# Patient Record
Sex: FEMALE | Race: WHITE | Employment: UNEMPLOYED | URBAN - NONMETROPOLITAN AREA
[De-identification: names, ages, dates, MRNs, and addresses within clinical notes are randomized per-mention and may not be internally consistent; named-entity substitution may affect disease eponyms.]

---

## 2019-05-04 ENCOUNTER — APPOINTMENT (OUTPATIENT)
Dept: GENERAL RADIOLOGY | Age: 44
End: 2019-05-04
Payer: MEDICARE

## 2019-05-04 ENCOUNTER — HOSPITAL ENCOUNTER (EMERGENCY)
Age: 44
Discharge: HOME | End: 2019-05-04
Attending: EMERGENCY MEDICINE
Payer: MEDICARE

## 2019-05-04 ENCOUNTER — HOSPITAL ENCOUNTER (OUTPATIENT)
Age: 44
Setting detail: OBSERVATION
Discharge: HOME | End: 2019-05-05
Attending: EMERGENCY MEDICINE | Admitting: FAMILY MEDICINE
Payer: MEDICARE

## 2019-05-04 ENCOUNTER — APPOINTMENT (OUTPATIENT)
Dept: CT IMAGING | Age: 44
End: 2019-05-04
Payer: MEDICARE

## 2019-05-04 VITALS
SYSTOLIC BLOOD PRESSURE: 113 MMHG | HEIGHT: 61 IN | BODY MASS INDEX: 40.78 KG/M2 | OXYGEN SATURATION: 95 % | TEMPERATURE: 98.6 F | RESPIRATION RATE: 26 BRPM | DIASTOLIC BLOOD PRESSURE: 73 MMHG | WEIGHT: 216 LBS | HEART RATE: 92 BPM

## 2019-05-04 DIAGNOSIS — R07.9 CHEST PAIN, UNSPECIFIED TYPE: Primary | ICD-10-CM

## 2019-05-04 DIAGNOSIS — E87.6 HYPOKALEMIA: ICD-10-CM

## 2019-05-04 PROBLEM — R07.2 SUBSTERNAL CHEST PAIN: Status: ACTIVE | Noted: 2019-05-04

## 2019-05-04 LAB
ALBUMIN SERPL-MCNC: 3.9 GM/DL (ref 3.4–5)
ALBUMIN SERPL-MCNC: 4.1 GM/DL (ref 3.4–5)
ALP BLD-CCNC: 41 IU/L (ref 40–129)
ALP BLD-CCNC: 47 IU/L (ref 40–129)
ALT SERPL-CCNC: 131 U/L (ref 10–40)
ALT SERPL-CCNC: 27 U/L (ref 10–40)
ANION GAP SERPL CALCULATED.3IONS-SCNC: 12 MMOL/L (ref 4–16)
ANION GAP SERPL CALCULATED.3IONS-SCNC: 13 MMOL/L (ref 4–16)
AST SERPL-CCNC: 144 IU/L (ref 15–37)
AST SERPL-CCNC: 32 IU/L (ref 15–37)
BASOPHILS ABSOLUTE: 0 K/CU MM
BASOPHILS ABSOLUTE: 0 K/CU MM
BASOPHILS RELATIVE PERCENT: 0.5 % (ref 0–1)
BASOPHILS RELATIVE PERCENT: 0.6 % (ref 0–1)
BILIRUB SERPL-MCNC: 0.3 MG/DL (ref 0–1)
BILIRUB SERPL-MCNC: 0.4 MG/DL (ref 0–1)
BUN BLDV-MCNC: 5 MG/DL (ref 6–23)
BUN BLDV-MCNC: 6 MG/DL (ref 6–23)
CALCIUM SERPL-MCNC: 8.8 MG/DL (ref 8.3–10.6)
CALCIUM SERPL-MCNC: 8.8 MG/DL (ref 8.3–10.6)
CHLORIDE BLD-SCNC: 101 MMOL/L (ref 99–110)
CHLORIDE BLD-SCNC: 102 MMOL/L (ref 99–110)
CO2: 24 MMOL/L (ref 21–32)
CO2: 25 MMOL/L (ref 21–32)
CREAT SERPL-MCNC: 0.7 MG/DL (ref 0.6–1.1)
CREAT SERPL-MCNC: 0.8 MG/DL (ref 0.6–1.1)
D DIMER: 748 NG/ML(DDU)
DIFFERENTIAL TYPE: ABNORMAL
DIFFERENTIAL TYPE: ABNORMAL
EOSINOPHILS ABSOLUTE: 0 K/CU MM
EOSINOPHILS ABSOLUTE: 0.1 K/CU MM
EOSINOPHILS RELATIVE PERCENT: 0.3 % (ref 0–3)
EOSINOPHILS RELATIVE PERCENT: 0.8 % (ref 0–3)
GFR AFRICAN AMERICAN: >60 ML/MIN/1.73M2
GFR AFRICAN AMERICAN: >60 ML/MIN/1.73M2
GFR NON-AFRICAN AMERICAN: >60 ML/MIN/1.73M2
GFR NON-AFRICAN AMERICAN: >60 ML/MIN/1.73M2
GLUCOSE BLD-MCNC: 121 MG/DL (ref 70–99)
GLUCOSE BLD-MCNC: 168 MG/DL (ref 70–99)
HCG QUALITATIVE: NEGATIVE
HCT VFR BLD CALC: 45.8 % (ref 37–47)
HCT VFR BLD CALC: 46 % (ref 37–47)
HEMOGLOBIN: 15.2 GM/DL (ref 12.5–16)
HEMOGLOBIN: 15.6 GM/DL (ref 12.5–16)
IMMATURE NEUTROPHIL %: 0.4 % (ref 0–0.43)
IMMATURE NEUTROPHIL %: 0.5 % (ref 0–0.43)
LIPASE: 24 IU/L (ref 13–60)
LYMPHOCYTES ABSOLUTE: 0.6 K/CU MM
LYMPHOCYTES ABSOLUTE: 0.6 K/CU MM
LYMPHOCYTES RELATIVE PERCENT: 7.9 % (ref 24–44)
LYMPHOCYTES RELATIVE PERCENT: 9.7 % (ref 24–44)
MAGNESIUM: 1.8 MG/DL (ref 1.8–2.4)
MCH RBC QN AUTO: 32.1 PG (ref 27–31)
MCH RBC QN AUTO: 32.1 PG (ref 27–31)
MCHC RBC AUTO-ENTMCNC: 33.2 % (ref 32–36)
MCHC RBC AUTO-ENTMCNC: 33.9 % (ref 32–36)
MCV RBC AUTO: 94.7 FL (ref 78–100)
MCV RBC AUTO: 96.6 FL (ref 78–100)
MONOCYTES ABSOLUTE: 0.3 K/CU MM
MONOCYTES ABSOLUTE: 0.6 K/CU MM
MONOCYTES RELATIVE PERCENT: 5.2 % (ref 0–4)
MONOCYTES RELATIVE PERCENT: 8.2 % (ref 0–4)
PDW BLD-RTO: 13.9 % (ref 11.7–14.9)
PDW BLD-RTO: 14.1 % (ref 11.7–14.9)
PLATELET # BLD: 280 K/CU MM (ref 140–440)
PLATELET # BLD: 280 K/CU MM (ref 140–440)
PMV BLD AUTO: 10.7 FL (ref 7.5–11.1)
PMV BLD AUTO: 10.8 FL (ref 7.5–11.1)
POTASSIUM SERPL-SCNC: 3.3 MMOL/L (ref 3.5–5.1)
POTASSIUM SERPL-SCNC: 3.3 MMOL/L (ref 3.5–5.1)
RBC # BLD: 4.74 M/CU MM (ref 4.2–5.4)
RBC # BLD: 4.86 M/CU MM (ref 4.2–5.4)
SEGMENTED NEUTROPHILS ABSOLUTE COUNT: 5 K/CU MM
SEGMENTED NEUTROPHILS ABSOLUTE COUNT: 6 K/CU MM
SEGMENTED NEUTROPHILS RELATIVE PERCENT: 82.6 % (ref 36–66)
SEGMENTED NEUTROPHILS RELATIVE PERCENT: 83.3 % (ref 36–66)
SODIUM BLD-SCNC: 138 MMOL/L (ref 135–145)
SODIUM BLD-SCNC: 139 MMOL/L (ref 135–145)
TOTAL IMMATURE NEUTOROPHIL: 0.03 K/CU MM
TOTAL IMMATURE NEUTOROPHIL: 0.03 K/CU MM
TOTAL PROTEIN: 6.7 GM/DL (ref 6.4–8.2)
TOTAL PROTEIN: 7.3 GM/DL (ref 6.4–8.2)
TROPONIN T: <0.01 NG/ML
TROPONIN T: <0.01 NG/ML
WBC # BLD: 6 K/CU MM (ref 4–10.5)
WBC # BLD: 7.2 K/CU MM (ref 4–10.5)

## 2019-05-04 PROCEDURE — 85025 COMPLETE CBC W/AUTO DIFF WBC: CPT

## 2019-05-04 PROCEDURE — 71045 X-RAY EXAM CHEST 1 VIEW: CPT

## 2019-05-04 PROCEDURE — 2580000003 HC RX 258: Performed by: NURSE PRACTITIONER

## 2019-05-04 PROCEDURE — 6360000004 HC RX CONTRAST MEDICATION: Performed by: EMERGENCY MEDICINE

## 2019-05-04 PROCEDURE — 93005 ELECTROCARDIOGRAM TRACING: CPT | Performed by: EMERGENCY MEDICINE

## 2019-05-04 PROCEDURE — 99285 EMERGENCY DEPT VISIT HI MDM: CPT

## 2019-05-04 PROCEDURE — 84703 CHORIONIC GONADOTROPIN ASSAY: CPT

## 2019-05-04 PROCEDURE — 80053 COMPREHEN METABOLIC PANEL: CPT

## 2019-05-04 PROCEDURE — 6370000000 HC RX 637 (ALT 250 FOR IP): Performed by: EMERGENCY MEDICINE

## 2019-05-04 PROCEDURE — G0378 HOSPITAL OBSERVATION PER HR: HCPCS

## 2019-05-04 PROCEDURE — 84484 ASSAY OF TROPONIN QUANT: CPT

## 2019-05-04 PROCEDURE — 83690 ASSAY OF LIPASE: CPT

## 2019-05-04 PROCEDURE — 71275 CT ANGIOGRAPHY CHEST: CPT

## 2019-05-04 PROCEDURE — 6370000000 HC RX 637 (ALT 250 FOR IP): Performed by: NURSE PRACTITIONER

## 2019-05-04 PROCEDURE — 83735 ASSAY OF MAGNESIUM: CPT

## 2019-05-04 PROCEDURE — 85379 FIBRIN DEGRADATION QUANT: CPT

## 2019-05-04 RX ORDER — NICOTINE 21 MG/24HR
1 PATCH, TRANSDERMAL 24 HOURS TRANSDERMAL DAILY
Status: DISCONTINUED | OUTPATIENT
Start: 2019-05-04 | End: 2019-05-05 | Stop reason: HOSPADM

## 2019-05-04 RX ORDER — ATORVASTATIN CALCIUM 40 MG/1
40 TABLET, FILM COATED ORAL NIGHTLY
Status: DISCONTINUED | OUTPATIENT
Start: 2019-05-04 | End: 2019-05-05 | Stop reason: HOSPADM

## 2019-05-04 RX ORDER — NITROGLYCERIN 0.4 MG/1
0.4 TABLET SUBLINGUAL EVERY 5 MIN PRN
Status: DISCONTINUED | OUTPATIENT
Start: 2019-05-04 | End: 2019-05-05 | Stop reason: HOSPADM

## 2019-05-04 RX ORDER — SODIUM CHLORIDE 0.9 % (FLUSH) 0.9 %
10 SYRINGE (ML) INJECTION EVERY 12 HOURS SCHEDULED
Status: DISCONTINUED | OUTPATIENT
Start: 2019-05-04 | End: 2019-05-05 | Stop reason: HOSPADM

## 2019-05-04 RX ORDER — SENNA PLUS 8.6 MG/1
1 TABLET ORAL DAILY PRN
Status: DISCONTINUED | OUTPATIENT
Start: 2019-05-04 | End: 2019-05-05 | Stop reason: HOSPADM

## 2019-05-04 RX ORDER — ASPIRIN 81 MG/1
81 TABLET, CHEWABLE ORAL DAILY
Status: DISCONTINUED | OUTPATIENT
Start: 2019-05-05 | End: 2019-05-05 | Stop reason: HOSPADM

## 2019-05-04 RX ORDER — POTASSIUM CHLORIDE 20 MEQ/1
40 TABLET, EXTENDED RELEASE ORAL ONCE
Status: COMPLETED | OUTPATIENT
Start: 2019-05-04 | End: 2019-05-04

## 2019-05-04 RX ORDER — HYDROCODONE BITARTRATE AND ACETAMINOPHEN 5; 325 MG/1; MG/1
1 TABLET ORAL EVERY 6 HOURS PRN
COMMUNITY

## 2019-05-04 RX ORDER — SODIUM CHLORIDE 0.9 % (FLUSH) 0.9 %
10 SYRINGE (ML) INJECTION PRN
Status: DISCONTINUED | OUTPATIENT
Start: 2019-05-04 | End: 2019-05-05 | Stop reason: HOSPADM

## 2019-05-04 RX ORDER — ONDANSETRON 2 MG/ML
4 INJECTION INTRAMUSCULAR; INTRAVENOUS EVERY 6 HOURS PRN
Status: DISCONTINUED | OUTPATIENT
Start: 2019-05-04 | End: 2019-05-05 | Stop reason: HOSPADM

## 2019-05-04 RX ORDER — IBUPROFEN 800 MG/1
800 TABLET ORAL EVERY 6 HOURS PRN
Status: DISCONTINUED | OUTPATIENT
Start: 2019-05-04 | End: 2019-05-05 | Stop reason: HOSPADM

## 2019-05-04 RX ORDER — HYDROCODONE BITARTRATE AND ACETAMINOPHEN 5; 325 MG/1; MG/1
1 TABLET ORAL EVERY 6 HOURS PRN
Status: DISCONTINUED | OUTPATIENT
Start: 2019-05-04 | End: 2019-05-05 | Stop reason: HOSPADM

## 2019-05-04 RX ADMIN — ATORVASTATIN CALCIUM 40 MG: 40 TABLET, FILM COATED ORAL at 22:00

## 2019-05-04 RX ADMIN — Medication 10 ML: at 22:00

## 2019-05-04 RX ADMIN — IOPAMIDOL 75 ML: 755 INJECTION, SOLUTION INTRAVENOUS at 15:23

## 2019-05-04 RX ADMIN — POTASSIUM CHLORIDE 40 MEQ: 20 TABLET, EXTENDED RELEASE ORAL at 20:57

## 2019-05-04 RX ADMIN — IBUPROFEN 800 MG: 800 TABLET ORAL at 21:59

## 2019-05-04 ASSESSMENT — PAIN DESCRIPTION - ORIENTATION
ORIENTATION: MID;UPPER
ORIENTATION: LEFT

## 2019-05-04 ASSESSMENT — PAIN DESCRIPTION - FREQUENCY: FREQUENCY: CONTINUOUS

## 2019-05-04 ASSESSMENT — PAIN SCALES - GENERAL
PAINLEVEL_OUTOF10: 0
PAINLEVEL_OUTOF10: 9
PAINLEVEL_OUTOF10: 0
PAINLEVEL_OUTOF10: 4

## 2019-05-04 ASSESSMENT — PAIN DESCRIPTION - ONSET
ONSET: SUDDEN
ONSET: PROGRESSIVE

## 2019-05-04 ASSESSMENT — PAIN DESCRIPTION - PROGRESSION
CLINICAL_PROGRESSION: GRADUALLY WORSENING
CLINICAL_PROGRESSION: GRADUALLY IMPROVING

## 2019-05-04 ASSESSMENT — PAIN DESCRIPTION - DESCRIPTORS
DESCRIPTORS: DISCOMFORT;ACHING
DESCRIPTORS: PRESSURE;SHARP

## 2019-05-04 ASSESSMENT — PAIN DESCRIPTION - LOCATION
LOCATION: BACK
LOCATION: BACK;CHEST;SHOULDER

## 2019-05-04 ASSESSMENT — PAIN - FUNCTIONAL ASSESSMENT: PAIN_FUNCTIONAL_ASSESSMENT: PREVENTS OR INTERFERES SOME ACTIVE ACTIVITIES AND ADLS

## 2019-05-04 ASSESSMENT — PAIN DESCRIPTION - PAIN TYPE
TYPE: ACUTE PAIN
TYPE: ACUTE PAIN

## 2019-05-04 NOTE — ED PROVIDER NOTES
eMERGENCY dEPARTMENT eNCOUnter      CHIEF COMPLAINT:   Chest pain    HPI: Raquel Metz is a 44 y.o. female who presents to the Emergency Department complaining of chest pain. The patient states that the pain started around 1 PM today. She describes it as a pressure. She rates it as 4 out of 10 pain. It radiated to her back. The pain was constant, however she states that it resolved on arrival to the ED. She denies any associated shortness of breath, nausea, vomiting or diaphoresis. There are no exacerbating or relieving factors. The patient denies a known history of coronary artery disease. Cardiac risk factors include tobacco abuse and family history. There is no known history of DVT, PE, or thoracic aortic dissection. The patient denies leg pain or leg swelling. The patient denies fevers, chills, neck pain, shortness of breath, cough, hemoptysis, abdominal pain, nausea, vomiting, numbness, tingling, weakness, or any other complaints. REVIEW OF SYSTEMS:  CONSTITUTIONAL:  Denies fever, chills, weight loss or weakness  EYES:  Denies photophobia or discharge  ENT:  Denies sore throat or ear pain  CARDIOVASCULAR: Complains of chest pain  RESPIRATORY:  Denies cough or shortness of breath  GI:  Denies abdominal pain, nausea, vomiting, or diarrhea  MUSCULOSKELETAL:  Denies back pain  SKIN:  No rash  NEUROLOGIC:  Denies headache, focal weakness or sensory changes  All systems negative except as marked. \"Remaining review of systems reviewed and negative. I have reviewed the nursing triage documentation and agree unless otherwise noted below. \"      PAST MEDICAL HISTORY:   History reviewed. No pertinent past medical history. CURRENT MEDICATIONS:   Home medications reviewed. SURGICAL HISTORY:   Past Surgical History:   Procedure Laterality Date    CHOLECYSTECTOMY      TUBAL LIGATION         FAMILY HISTORY:   History reviewed. No pertinent family history.     SOCIAL HISTORY:   Social History     Socioeconomic History    Marital status:      Spouse name: Not on file    Number of children: Not on file    Years of education: Not on file    Highest education level: Not on file   Occupational History    Not on file   Social Needs    Financial resource strain: Not on file    Food insecurity:     Worry: Not on file     Inability: Not on file    Transportation needs:     Medical: Not on file     Non-medical: Not on file   Tobacco Use    Smoking status: Current Every Day Smoker     Packs/day: 2.00     Types: Cigarettes    Smokeless tobacco: Never Used   Substance and Sexual Activity    Alcohol use: No    Drug use: No    Sexual activity: Not Currently   Lifestyle    Physical activity:     Days per week: Not on file     Minutes per session: Not on file    Stress: Not on file   Relationships    Social connections:     Talks on phone: Not on file     Gets together: Not on file     Attends Catholic service: Not on file     Active member of club or organization: Not on file     Attends meetings of clubs or organizations: Not on file     Relationship status: Not on file    Intimate partner violence:     Fear of current or ex partner: Not on file     Emotionally abused: Not on file     Physically abused: Not on file     Forced sexual activity: Not on file   Other Topics Concern    Not on file   Social History Narrative    Not on file       ALLERGIES: Morphine    PHYSICAL EXAM:  VITAL SIGNS:   ED Triage Vitals [05/04/19 1405]   Enc Vitals Group      /82      Pulse 91      Resp 14      Temp 98.6 °F (37 °C)      Temp Source Oral      SpO2 94 %      Weight 216 lb (98 kg)      Height 5' 1\" (1.549 m)      Head Circumference       Peak Flow       Pain Score       Pain Loc       Pain Edu? Excl. in [address]?       Constitutional:  Non-toxic appearance  HENT: Normocephalic, Atraumatic, Bilateral external ears normal, Oropharynx moist, No oral exudates, Nose normal.  Eyes:  PERRL, Conjunctiva normal, No discharge. Neck: Normal range of motion, No tenderness, Supple, No stridor, No lymphadenopathy  Cardiovascular:  Normal heart rate, Normal rhythm  Pulmonary/Chest:  Normal breath sounds, No respiratory distress, No wheezing, No chest tenderness  Abdomen: Bowel sounds normal, Soft, No tenderness, No masses, No pulsatile masses  Back:  No tenderness, No CVA tenderness  Extremities:  Normal range of motion, Intact distal pulses, No edema, No tenderness  Skin:  Warm, Dry, No erythema, No rash      EKG Interpretation  Interpreted by me  Compared to 6/1/16  Rhythm: normal sinus  Rate: normal 99  Axis: normal  Ectopy: none  Conduction: normal  ST Segments: no acute change  T Waves: no acute change  Q Waves: none  Clinical Impression: normal sinus rhythm, no acute changes    Cardiac Monitor Strip Interpretation  Interpreted by me  Monitor strip interpreted for greater than 10 seconds  Rhythm: normal sinus  Rate: normal  Ectopy: none  ST Segments: normal      Radiology / Procedures:  CTA PULMONARY W CONTRAST (Final result)   Result time 05/04/19 15:35:25   Final result by Toribio Engle MD (05/04/19 15:35:25)                Impression:    No evidence of pulmonary embolism or acute pulmonary abnormality. Mild centrilobular emphysematous changes. Narrative:    EXAMINATION:  CTA OF THE CHEST 5/4/2019 2:50 pm    TECHNIQUE:  CTA of the chest was performed after the administration of intravenous  contrast.  Multiplanar reformatted images are provided for review.  MIP  images are provided for review. Dose modulation, iterative reconstruction,  and/or weight based adjustment of the mA/kV was utilized to reduce the  radiation dose to as low as reasonably achievable. COMPARISON:  None.     HISTORY:  ORDERING SYSTEM PROVIDED HISTORY: Chest pain, Elevated d-dimer  TECHNOLOGIST PROVIDED HISTORY:  Additional signs and symptoms: 75ml [id number], cp, elevated d-dimer    FINDINGS:  Pulmonary Arteries: Pulmonary arteries are adequately opacified for  evaluation.  No evidence of intraluminal filling defect to suggest pulmonary  embolism.  Main pulmonary artery is normal in caliber. Mediastinum: No evidence of mediastinal lymphadenopathy.  The heart and  pericardium demonstrate no acute abnormality.  There is no acute abnormality  of the thoracic aorta. Lungs/pleura: Mild centrilobular emphysematous changes.  No focal  consolidation or pleural effusion.  No pneumothorax. Upper Abdomen: Limited images of the upper abdomen are unremarkable. Soft Tissues/Bones: No acute bone or soft tissue abnormality.                    XR CHEST PORTABLE (Final result)   Result time 05/04/19 14:30:59   Final result by Khadar Mcdaniel MD (05/04/19 14:30:59)                Impression:    No evidence of acute process in the chest.            Narrative:    EXAMINATION:  SINGLE XRAY VIEW OF THE CHEST    5/4/2019 2:20 pm    COMPARISON:  10/16/2009    HISTORY:  ORDERING SYSTEM PROVIDED HISTORY: chest pain  TECHNOLOGIST PROVIDED HISTORY:  Reason for exam:->chest pain    FINDINGS:  Normal heart size and pulmonary vasculature.  No focal consolidations,  pleural effusions, or pneumothorax.                  Labs Reviewed   CBC WITH AUTO DIFFERENTIAL - Abnormal; Notable for the following components:       Result Value    MCH 32.1 (*)     Segs Relative 82.6 (*)     Lymphocytes % 7.9 (*)     Monocytes % 8.2 (*)     All other components within normal limits   COMPREHENSIVE METABOLIC PANEL - Abnormal; Notable for the following components:    Potassium 3.3 (*)     Glucose 121 (*)     All other components within normal limits   D-DIMER, QUANTITATIVE - Abnormal; Notable for the following components:    D-Dimer, Quant 748 (*)     All other components within normal limits   TROPONIN   HCG, SERUM, QUALITATIVE       ED COURSE & MEDICAL DECISION MAKING:  Pertinent Labs & Imaging studies reviewed.  (See chart for details)  On exam, the patient is afebrile and nontoxic phrases that may be inappropriate. If there are any questions or concerns please feel free to contact the dictating provider for clarification.         Melody Herrmann MD  05/04/19 3465

## 2019-05-04 NOTE — ED TRIAGE NOTES
Patient was discharged earlier today with unspecified chest pain. Patient was driving home when she became weak and pain noted between her shoulder blades.  Patient states she felt her heart fluttering and felt like \"the life was draining from her\"

## 2019-05-04 NOTE — ED PROVIDER NOTES
EMERGENCY DEPARTMENT H&P    Patient Name:  Kelly Mendosa  :  1975  MRN:  [id number]  Date of Visit:  2019    Triage Chief Complaint:   Chest Pain (Patient was discharged earlier today with diagnosis of unspecified chest pain. Patient was on way home when she felt suddenly very tired and her heart fluttering. Patient states she felt like \"the life was draining from her. \" Patient has pain between back shoulder blades. )    HPI:  Kelly Mendosa is a 44 y.o. female who presents for c/o chest pain. Patient reports that she developed chest pain earlier today around 1 PM and was seen here in the emergency department for her symptoms. She notes her pain is a pressure and squeezing sensation, occasionally radiates to her back, is currently rated at 4/10. She notes that the pain has been intermittent throughout the afternoon. She notes that prior to discharge from the ED, she was pain-free. She was driving on her way back home when she suddenly felt lightheaded \"like the life was draining from me\" and had another recurrence of chest pain and states that she felt like she was going to lose consciousness but did not. She also felt palpitations. She also felt nauseated however did not vomit. She notes that those symptoms have otherwise resolved since they started and she only complains of mild chest pressure at this time. She did not take anything for the pain prior to arrival. She states that she has never had these symptoms previously. She does report a history of tobacco abuse however denies other substance abuse. Denies any history of DVT or PE.    ROS:  Review of Systems  Ten point ROS was performed and is negative except as stated in HPI above. Review of systems obtained from the patient. PMH:  None    Past Surgical History:   Procedure Laterality Date    CHOLECYSTECTOMY      TUBAL LIGATION       History reviewed. No pertinent family history.      Social History     Socioeconomic History    Marital overt adenopathy or masses. No JVD. CARDIOVASCULAR: RRR. Normal s1/s2. No murmur. +2/4 bilateral radial and femoral pulses. Normal capillary refill of the extremities noted. No LE edema noted. RESPIRATORY: Lungs clear to auscultation bilaterally. No respiratory distress or accessory muscle usage. ABDOMEN: Soft, no tenderness to palpation. Non-distended, no guarding / rebound. SKIN: Warm. Dry. Intact. No obvious skin abnormalities noted. MUSCULOSKELETAL: No swelling or deformities noted. No visible overt ROM restriction noted. BACK: no paraspinal / mid-line vertebral / flank tenderness noted. NEURO: The patient is awake, alert, interactive. Follows commands and answers questions appropriately. Speech is fluent with intact cognition. PSYCHIATRIC: Appropriate.  Cooperative with exam.     I have reviewed and interpreted all of the currently available lab results from this visit:  Results for orders placed or performed during the hospital encounter of 05/04/19   CBC Auto Differential   Result Value Ref Range    WBC 6.0 4.0 - 10.5 K/CU MM    RBC 4.74 4.2 - 5.4 M/CU MM    Hemoglobin 15.2 12.5 - 16.0 GM/DL    Hematocrit 45.8 37 - 47 %    MCV 96.6 78 - 100 FL    MCH 32.1 (H) 27 - 31 PG    MCHC 33.2 32.0 - 36.0 %    RDW 14.1 11.7 - 14.9 %    Platelets 132 241 - 712 K/CU MM    MPV 10.7 7.5 - 11.1 FL    Differential Type AUTOMATED DIFFERENTIAL     Segs Relative 83.3 (H) 36 - 66 %    Lymphocytes % 9.7 (L) 24 - 44 %    Monocytes % 5.2 (H) 0 - 4 %    Eosinophils % 0.8 0 - 3 %    Basophils % 0.5 0 - 1 %    Segs Absolute 5.0 K/CU MM    Lymphocytes # 0.6 K/CU MM    Monocytes # 0.3 K/CU MM    Eosinophils # 0.1 K/CU MM    Basophils # 0.0 K/CU MM    Immature Neutrophil % 0.5 (H) 0 - 0.43 %    Total Immature Neutrophil 0.03 K/CU MM   Comprehensive Metabolic Panel w/ Reflex to MG   Result Value Ref Range    Sodium 139 135 - 145 MMOL/L    Potassium 3.3 (L) 3.5 - 5.1 MMOL/L    Chloride 102 99 - 110 mMol/L    CO2 24 21 - 32 MMOL/L BUN 5 (L) 6 - 23 MG/DL    CREATININE 0.8 0.6 - 1.1 MG/DL    Glucose 168 (H) 70 - 99 MG/DL    Calcium 8.8 8.3 - 10.6 MG/DL    Alb 3.9 3.4 - 5.0 GM/DL    Total Protein 6.7 6.4 - 8.2 GM/DL    Total Bilirubin 0.3 0.0 - 1.0 MG/DL     (H) 10 - 40 U/L     (H) 15 - 37 IU/L    Alkaline Phosphatase 47 40 - 129 IU/L    GFR Non-African American >60 >60 mL/min/1.73m2    GFR African American >60 >60 mL/min/1.73m2    Anion Gap 13 4 - 16   Troponin   Result Value Ref Range    Troponin T <0.010 <0.01 NG/ML   Lipase   Result Value Ref Range    Lipase 24 13 - 60 IU/L   Magnesium   Result Value Ref Range    Magnesium 1.8 1.8 - 2.4 mg/dl     Radiographs:  Radiologist's Report Reviewed:  XR CHEST PORTABLE   Final Result   No acute cardiopulmonary disease. EKG: (All EKG's are interpreted by myself in the absence of a cardiologist)    Rhythm: Normal sinus rhythm  Rate: 90  Axis: Normal  Ectopy: None  Conduction: Normal  ST Segments: No acute elevations or depressions noted. T Waves: No acute findings. Medications administered:  Medications   potassium chloride (KLOR-CON M) extended release tablet 40 mEq (40 mEq Oral Given 5/4/19 2057)       ED COURSE & MDM:  Nursing notes and vital signs were reviewed. The patient's medical record and available pertinent information was also reviewed if available. Pt presents with stable VS. Please see history and exam above. Workup initiated as above. Patient was offered pain medication however she has declined. Of note, patient's previous ED workup this afternoon was unremarkable for acute findings. CT of the chest was performed and was negative for PE, signs of aortic dissection or other acute findings. EKG shows no acute findings at this time. Chest x-ray unremarkable. Troponin negative. Lipase within normal limits. CBC stable. CMP shows mild hypokalemia, replaced with 40 mEq orally. Pt was reassessed. She noted her chest pain had again resolved.  Vital signs remained stable. Due to the fact that patient has had ongoing symptoms and has already tried discharge home today with recurrence of her symptoms, I recommended admission to the hospital for monitoring and further evaluation. She was agreeable to this plan. Case was discussed with hospitalist nurse practitioner who accepted the patient for admission and will continue care. Clinical Impression:  1. Chest pain, unspecified type    2. Hypokalemia      Comment: Please note this report has been produced using speech recognition software and may contain errors related to that system including errors in grammar, punctuation, and spelling, as well as words and phrases that may be inappropriate. If there are any questions or concerns please feel free to contact the dictating provider for clarification.     Electronically Signed:        Katina Grossman DO  05/04/19 9336

## 2019-05-04 NOTE — ED NOTES
Saline lock DC'd. Discharge instructions and Follow up care reviewed. Pt acknowledges understanding. Ambulatory at discharge.       Henok Thompson RN  05/04/19 0974

## 2019-05-05 VITALS
TEMPERATURE: 96.6 F | RESPIRATION RATE: 16 BRPM | DIASTOLIC BLOOD PRESSURE: 70 MMHG | HEART RATE: 78 BPM | WEIGHT: 220 LBS | HEIGHT: 61 IN | OXYGEN SATURATION: 92 % | BODY MASS INDEX: 41.54 KG/M2 | SYSTOLIC BLOOD PRESSURE: 116 MMHG

## 2019-05-05 LAB
ALBUMIN SERPL-MCNC: 3.6 GM/DL (ref 3.4–5)
ALP BLD-CCNC: 45 IU/L (ref 40–129)
ALT SERPL-CCNC: 101 U/L (ref 10–40)
ANION GAP SERPL CALCULATED.3IONS-SCNC: 10 MMOL/L (ref 4–16)
AST SERPL-CCNC: 70 IU/L (ref 15–37)
BILIRUB SERPL-MCNC: 0.2 MG/DL (ref 0–1)
BILIRUBIN DIRECT: 0.2 MG/DL (ref 0–0.3)
BILIRUBIN, INDIRECT: 0 MG/DL (ref 0–0.7)
BUN BLDV-MCNC: 4 MG/DL (ref 6–23)
CALCIUM SERPL-MCNC: 8.5 MG/DL (ref 8.3–10.6)
CHLORIDE BLD-SCNC: 107 MMOL/L (ref 99–110)
CHOLESTEROL: 108 MG/DL
CO2: 24 MMOL/L (ref 21–32)
CREAT SERPL-MCNC: 0.7 MG/DL (ref 0.6–1.1)
GFR AFRICAN AMERICAN: >60 ML/MIN/1.73M2
GFR NON-AFRICAN AMERICAN: >60 ML/MIN/1.73M2
GLUCOSE BLD-MCNC: 115 MG/DL (ref 70–99)
HCT VFR BLD CALC: 43.8 % (ref 37–47)
HDLC SERPL-MCNC: 27 MG/DL
HEMOGLOBIN: 14.4 GM/DL (ref 12.5–16)
LDL CHOLESTEROL DIRECT: 73 MG/DL
MAGNESIUM: 2 MG/DL (ref 1.8–2.4)
MCH RBC QN AUTO: 32.1 PG (ref 27–31)
MCHC RBC AUTO-ENTMCNC: 32.9 % (ref 32–36)
MCV RBC AUTO: 97.6 FL (ref 78–100)
PDW BLD-RTO: 14.2 % (ref 11.7–14.9)
PLATELET # BLD: 245 K/CU MM (ref 140–440)
PMV BLD AUTO: 10.4 FL (ref 7.5–11.1)
POTASSIUM SERPL-SCNC: 3.8 MMOL/L (ref 3.5–5.1)
RBC # BLD: 4.49 M/CU MM (ref 4.2–5.4)
SODIUM BLD-SCNC: 141 MMOL/L (ref 135–145)
TOTAL PROTEIN: 6.3 GM/DL (ref 6.4–8.2)
TRIGL SERPL-MCNC: 79 MG/DL
TROPONIN T: <0.01 NG/ML
TROPONIN T: <0.01 NG/ML
WBC # BLD: 4 K/CU MM (ref 4–10.5)

## 2019-05-05 PROCEDURE — 83721 ASSAY OF BLOOD LIPOPROTEIN: CPT

## 2019-05-05 PROCEDURE — 83735 ASSAY OF MAGNESIUM: CPT

## 2019-05-05 PROCEDURE — 6370000000 HC RX 637 (ALT 250 FOR IP): Performed by: NURSE PRACTITIONER

## 2019-05-05 PROCEDURE — 80061 LIPID PANEL: CPT

## 2019-05-05 PROCEDURE — 85027 COMPLETE CBC AUTOMATED: CPT

## 2019-05-05 PROCEDURE — 80053 COMPREHEN METABOLIC PANEL: CPT

## 2019-05-05 PROCEDURE — 82248 BILIRUBIN DIRECT: CPT

## 2019-05-05 PROCEDURE — 93005 ELECTROCARDIOGRAM TRACING: CPT | Performed by: NURSE PRACTITIONER

## 2019-05-05 PROCEDURE — 84484 ASSAY OF TROPONIN QUANT: CPT

## 2019-05-05 PROCEDURE — G0378 HOSPITAL OBSERVATION PER HR: HCPCS

## 2019-05-05 PROCEDURE — 36415 COLL VENOUS BLD VENIPUNCTURE: CPT

## 2019-05-05 RX ORDER — FAMOTIDINE 20 MG/1
20 TABLET, FILM COATED ORAL 2 TIMES DAILY
Status: DISCONTINUED | OUTPATIENT
Start: 2019-05-05 | End: 2019-05-05 | Stop reason: HOSPADM

## 2019-05-05 RX ORDER — VARENICLINE TARTRATE 1 MG/1
1 TABLET, FILM COATED ORAL 2 TIMES DAILY
Qty: 60 TABLET | Refills: 0 | Status: SHIPPED | OUTPATIENT
Start: 2019-05-05

## 2019-05-05 RX ADMIN — FAMOTIDINE 20 MG: 20 TABLET ORAL at 08:17

## 2019-05-05 RX ADMIN — HYDROCODONE BITARTRATE AND ACETAMINOPHEN 1 TABLET: 5; 325 TABLET ORAL at 08:22

## 2019-05-05 ASSESSMENT — PAIN SCALES - GENERAL
PAINLEVEL_OUTOF10: 6
PAINLEVEL_OUTOF10: 0
PAINLEVEL_OUTOF10: 6

## 2019-05-05 NOTE — H&P
y.o.  female  who presents with chest pain. Patient was seen in ED earlier today for same and was discharged home. Patient lives in [facility] and was visiting her aunt when she began experiencing chest pain. Patient was actually on her way home from ED and began to feel \" like the life was draining out of me\" with a worsening of chest pain. Chest pain is described as pressure/ pain that is acute, consistent, 7/10, radiates to bilateral arms and through to back. Patient denies alleviating factors or exacerbating factors. Ten point ROS reviewed negative, unless as noted above    Objective:   No intake or output data in the 24 hours ending 05/04/19 2121   Vitals:   Vitals:    05/04/19 2103   BP: 111/83   Pulse: 93   Resp: 20   Temp:    SpO2: 99%     Physical Exam:   GEN Awake female, sitting upright in bed in no apparent distress. Appears given age. EYES Pupils are equally round. No scleral erythema, discharge, or conjunctivitis. HENT Mucous membranes are moist. Oral pharynx without exudates, no evidence of thrush. NECK Supple, no apparent thyromegaly or masses. RESP Clear to auscultation, no wheezes, rales or rhonchi. Symmetric chest movement while on room air. CARDIO/VASC S1/S2 auscultated. Regular rate without appreciable murmurs, rubs, or gallops. No JVD or carotid bruits. Peripheral pulses equal bilaterally and palpable. No peripheral edema. GI Abdomen is soft without significant tenderness, masses, or guarding. Bowel sounds are normoactive. Rectal exam deferred.  No costovertebral angle tenderness. Normal appearing external genitalia. Lozoya catheter is not present. HEME/LYMPH No palpable cervical lymphadenopathy and no hepatosplenomegaly. No petechiae or ecchymoses. MSK No gross joint deformities. SKIN Normal coloration, warm, dry. NEURO Cranial nerves appear grossly intact, normal speech, no lateralizing weakness. PSYCH Awake, alert, oriented x 4. Affect appropriate.     Past Medical History:    History reviewed. No pertinent past medical history. PSHX:  has a past surgical history that includes Tubal ligation and Cholecystectomy. Allergies: Allergies   Allergen Reactions    Morphine Itching       FAM HX: family history is not on file. Soc HX:   Social History     Socioeconomic History    Marital status:      Spouse name: None    Number of children: None    Years of education: None    Highest education level: None   Occupational History    None   Social Needs    Financial resource strain: None    Food insecurity:     Worry: None     Inability: None    Transportation needs:     Medical: None     Non-medical: None   Tobacco Use    Smoking status: Current Every Day Smoker     Packs/day: 2.00     Types: Cigarettes    Smokeless tobacco: Never Used   Substance and Sexual Activity    Alcohol use: No    Drug use: No    Sexual activity: Not Currently   Lifestyle    Physical activity:     Days per week: None     Minutes per session: None    Stress: None   Relationships    Social connections:     Talks on phone: None     Gets together: None     Attends Mu-ism service: None     Active member of club or organization: None     Attends meetings of clubs or organizations: None     Relationship status: None    Intimate partner violence:     Fear of current or ex partner: None     Emotionally abused: None     Physically abused: None     Forced sexual activity: None   Other Topics Concern    None   Social History Narrative    None       Medications:     Prior to Admission medications    Medication Sig Start Date End Date Taking? Authorizing Provider   HYDROcodone-acetaminophen (NORCO) 5-325 MG per tablet Take 1 tablet by mouth every 6 hours as needed for Pain.    Yes Historical Provider, MD           Electronically signed by MANUELA Brenner CNP on 5/4/2019 at 9:21 PM

## 2019-05-05 NOTE — PROGRESS NOTES
Pt was able to ambulate independently in the lewis without difficulty. No chest pain noted. Physician is aware.

## 2019-05-05 NOTE — DISCHARGE SUMMARY
07/19/2012    LABALBU 3.6 05/05/2019    CALCIUM 8.5 05/05/2019    BILITOT 0.2 05/05/2019    ALKPHOS 45 05/05/2019    AST 70 05/05/2019     05/05/2019     Patient Instructions:   Moon [facility]   Home Medication Instructions TWA:[id number]    Printed on:05/05/19 9117   Medication Information                      HYDROcodone-acetaminophen (NORCO) 5-325 MG per tablet  Take 1 tablet by mouth every 6 hours as needed for Pain. Code Status: Full Code     Consults:   None    Diet: regular diet    Activity: activity as tolerated   Work:    Discharged Condition: good    Prognosis: Fair - Good    Disposition: home     Follow-up with   1. PCP within  5-7 Days    Follow up labs: prn       Discharge Physician Signed: Jarek Cobb M.D. The patient was seen and examined on day of discharge and this discharge summary is in conjunction with any daily progress note from day of discharge.   Approximate time spent on discharge > 30 minutes

## 2019-05-06 LAB
EKG ATRIAL RATE: 80 BPM
EKG ATRIAL RATE: 90 BPM
EKG ATRIAL RATE: 99 BPM
EKG DIAGNOSIS: NORMAL
EKG P AXIS: 18 DEGREES
EKG P AXIS: 51 DEGREES
EKG P AXIS: 58 DEGREES
EKG P-R INTERVAL: 138 MS
EKG P-R INTERVAL: 152 MS
EKG P-R INTERVAL: 158 MS
EKG Q-T INTERVAL: 364 MS
EKG Q-T INTERVAL: 374 MS
EKG Q-T INTERVAL: 404 MS
EKG QRS DURATION: 88 MS
EKG QRS DURATION: 88 MS
EKG QRS DURATION: 92 MS
EKG QTC CALCULATION (BAZETT): 445 MS
EKG QTC CALCULATION (BAZETT): 465 MS
EKG QTC CALCULATION (BAZETT): 479 MS
EKG R AXIS: 29 DEGREES
EKG R AXIS: 41 DEGREES
EKG R AXIS: 43 DEGREES
EKG T AXIS: 13 DEGREES
EKG T AXIS: 22 DEGREES
EKG T AXIS: 7 DEGREES
EKG VENTRICULAR RATE: 80 BPM
EKG VENTRICULAR RATE: 90 BPM
EKG VENTRICULAR RATE: 99 BPM

## 2019-05-06 PROCEDURE — 93010 ELECTROCARDIOGRAM REPORT: CPT | Performed by: INTERNAL MEDICINE

## 2019-06-01 ENCOUNTER — HOSPITAL ENCOUNTER (EMERGENCY)
Age: 44
Discharge: HOME | End: 2019-06-01
Attending: EMERGENCY MEDICINE
Payer: MEDICARE

## 2019-06-01 VITALS
HEIGHT: 62 IN | BODY MASS INDEX: 39.56 KG/M2 | DIASTOLIC BLOOD PRESSURE: 69 MMHG | RESPIRATION RATE: 19 BRPM | WEIGHT: 215 LBS | SYSTOLIC BLOOD PRESSURE: 107 MMHG | OXYGEN SATURATION: 95 % | HEART RATE: 83 BPM | TEMPERATURE: 98.5 F

## 2019-06-01 DIAGNOSIS — L51.1 STEVENS-JOHNSON SYNDROME (HCC): Primary | ICD-10-CM

## 2019-06-01 LAB
ALBUMIN SERPL-MCNC: 4.1 GM/DL (ref 3.4–5)
ALP BLD-CCNC: 54 IU/L (ref 40–129)
ALT SERPL-CCNC: 16 U/L (ref 10–40)
ANION GAP SERPL CALCULATED.3IONS-SCNC: 11 MMOL/L (ref 4–16)
APTT: 29.3 SECONDS (ref 21.2–33)
AST SERPL-CCNC: 16 IU/L (ref 15–37)
BASOPHILS ABSOLUTE: 0 K/CU MM
BASOPHILS RELATIVE PERCENT: 0.2 % (ref 0–1)
BILIRUB SERPL-MCNC: 0.2 MG/DL (ref 0–1)
BUN BLDV-MCNC: 10 MG/DL (ref 6–23)
CALCIUM SERPL-MCNC: 9 MG/DL (ref 8.3–10.6)
CHLORIDE BLD-SCNC: 104 MMOL/L (ref 99–110)
CO2: 23 MMOL/L (ref 21–32)
CREAT SERPL-MCNC: 0.8 MG/DL (ref 0.6–1.1)
DIFFERENTIAL TYPE: ABNORMAL
EOSINOPHILS ABSOLUTE: 0 K/CU MM
EOSINOPHILS RELATIVE PERCENT: 0.1 % (ref 0–3)
ERYTHROCYTE SEDIMENTATION RATE: 17 MM/HR (ref 0–20)
GFR AFRICAN AMERICAN: >60 ML/MIN/1.73M2
GFR NON-AFRICAN AMERICAN: >60 ML/MIN/1.73M2
GLUCOSE BLD-MCNC: 125 MG/DL (ref 70–99)
HCT VFR BLD CALC: 47.7 % (ref 37–47)
HEMOGLOBIN: 15.5 GM/DL (ref 12.5–16)
HIGH SENSITIVE C-REACTIVE PROTEIN: 2.8 MG/L
IMMATURE NEUTROPHIL %: 0.3 % (ref 0–0.43)
INR BLD: 1.05 INDEX
LYMPHOCYTES ABSOLUTE: 1.5 K/CU MM
LYMPHOCYTES RELATIVE PERCENT: 9.9 % (ref 24–44)
MCH RBC QN AUTO: 31.1 PG (ref 27–31)
MCHC RBC AUTO-ENTMCNC: 32.5 % (ref 32–36)
MCV RBC AUTO: 95.6 FL (ref 78–100)
MONOCYTES ABSOLUTE: 0.7 K/CU MM
MONOCYTES RELATIVE PERCENT: 4.8 % (ref 0–4)
NUCLEATED RBC %: 0 %
PDW BLD-RTO: 13.6 % (ref 11.7–14.9)
PLATELET # BLD: 394 K/CU MM (ref 140–440)
PMV BLD AUTO: 10 FL (ref 7.5–11.1)
POTASSIUM SERPL-SCNC: 3.8 MMOL/L (ref 3.5–5.1)
PROTHROMBIN TIME: 12 SECONDS (ref 9.12–12.5)
RBC # BLD: 4.99 M/CU MM (ref 4.2–5.4)
SEGMENTED NEUTROPHILS ABSOLUTE COUNT: 12.7 K/CU MM
SEGMENTED NEUTROPHILS RELATIVE PERCENT: 84.7 % (ref 36–66)
SODIUM BLD-SCNC: 138 MMOL/L (ref 135–145)
TOTAL IMMATURE NEUTOROPHIL: 0.05 K/CU MM
TOTAL NUCLEATED RBC: 0 K/CU MM
TOTAL PROTEIN: 7.5 GM/DL (ref 6.4–8.2)
WBC # BLD: 15 K/CU MM (ref 4–10.5)

## 2019-06-01 PROCEDURE — 86141 C-REACTIVE PROTEIN HS: CPT

## 2019-06-01 PROCEDURE — 85730 THROMBOPLASTIN TIME PARTIAL: CPT

## 2019-06-01 PROCEDURE — 85610 PROTHROMBIN TIME: CPT

## 2019-06-01 PROCEDURE — 99284 EMERGENCY DEPT VISIT MOD MDM: CPT

## 2019-06-01 PROCEDURE — 80053 COMPREHEN METABOLIC PANEL: CPT

## 2019-06-01 PROCEDURE — 85025 COMPLETE CBC W/AUTO DIFF WBC: CPT

## 2019-06-01 PROCEDURE — 85652 RBC SED RATE AUTOMATED: CPT

## 2019-06-01 NOTE — ED PROVIDER NOTES
eMERGENCY dEPARTMENT eNCOUnter      PCP: No primary care provider on file. CHIEF COMPLAINT    Chief Complaint   Patient presents with    Rash     red blistering rash; denies itching; yard work 3 weeks ago but nothing recent; reports starting Chantix recently but had rash prior to         HPI    Ceci Cadena is a 44 y.o. female who presented to the emergency department today with a continuous rash to the face, upper extremities, upper back/chest area, right leg. Patient states this is been ongoing for the last week and a half. Patient admits that she recently had dental surgery and dentures placed. In that timeframe she has been on several rounds of antibiotics which she is unsure of the names, she does admit that she's been on amoxicillin but does not know any names of the other antibacterial been on. Patient also states that she was taking Chantix prior to the beginning of the rash, she is discontinued these medications. She does admit that she was seen in urgent care and started on steroids which minimally helped her condition. She states that she's been having pain into the hands and into the palm area. She is having drainage from the eyes and her eyes much at upon awakening. She states that there is a intraoral lesion that she has noticed. REVIEW OF SYSTEMS    Constitutional: No fever, chills  HENT:  Denies upper respiratory symptoms  Respiratory:  No cough or shortness of breath   Cardiovascular:  No chest pain  GI:  Denies abdominal pain, nausea, vomiting, or diarrhea  :  Denies any urinary symptoms or vaginal symptoms. Musculoskeletal:  See HPI. Denies backpain. Skin:  See HPI. Lymphatic:  Denies swollen glands or streaks.    Endocrine:  Denies polyuria or polydypsia   Neurologic:  Denies headache, focal weakness or sensory changes  All other review of systems are negative  See HPI and nursing notes for additional information     PAST MEDICAL HISTORY/SURGICAL HISTORY     History reviewed. No pertinent past medical history. Past Surgical History:   Procedure Laterality Date    CHOLECYSTECTOMY      DENTAL SURGERY      TUBAL LIGATION         CURRENT MEDICATIONS    Current Outpatient Rx   Medication Sig Dispense Refill    varenicline (CHANTIX) 1 MG tablet Take 1 tablet by mouth 2 times daily 60 tablet 0    HYDROcodone-acetaminophen (NORCO) 5-325 MG per tablet Take 1 tablet by mouth every 6 hours as needed for Pain. ALLERGIES    Allergies   Allergen Reactions    Morphine Itching    Tape Tereasa Pluck Tape] Rash       FAMILY HISTORY/SOCIAL HISTORY    History reviewed. No pertinent family history.   Social History     Socioeconomic History    Marital status:      Spouse name: None    Number of children: None    Years of education: None    Highest education level: None   Occupational History    None   Social Needs    Financial resource strain: None    Food insecurity:     Worry: None     Inability: None    Transportation needs:     Medical: None     Non-medical: None   Tobacco Use    Smoking status: Current Every Day Smoker     Packs/day: 1.50     Types: Cigarettes    Smokeless tobacco: Never Used   Substance and Sexual Activity    Alcohol use: No    Drug use: No    Sexual activity: Not Currently   Lifestyle    Physical activity:     Days per week: None     Minutes per session: None    Stress: None   Relationships    Social connections:     Talks on phone: None     Gets together: None     Attends Yazdanism service: None     Active member of club or organization: None     Attends meetings of clubs or organizations: None     Relationship status: None    Intimate partner violence:     Fear of current or ex partner: None     Emotionally abused: None     Physically abused: None     Forced sexual activity: None   Other Topics Concern    None   Social History Narrative    None     PHYSICAL EXAM    VITAL SIGNS: /75   Pulse 83   Temp 98.5 °F (36.9 °C) (Oral) Total Immature Neutrophil 0.05 K/CU MM    Immature Neutrophil % 0.3 0 - 0.43 %   CMP   Result Value Ref Range    Sodium 138 135 - 145 MMOL/L    Potassium 3.8 3.5 - 5.1 MMOL/L    Chloride 104 99 - 110 mMol/L    CO2 23 21 - 32 MMOL/L    BUN 10 6 - 23 MG/DL    CREATININE 0.8 0.6 - 1.1 MG/DL    Glucose 125 (H) 70 - 99 MG/DL    Calcium 9.0 8.3 - 10.6 MG/DL    Alb 4.1 3.4 - 5.0 GM/DL    Total Protein 7.5 6.4 - 8.2 GM/DL    Total Bilirubin 0.2 0.0 - 1.0 MG/DL    ALT 16 10 - 40 U/L    AST 16 15 - 37 IU/L    Alkaline Phosphatase 54 40 - 129 IU/L    GFR Non-African American >60 >60 mL/min/1.73m2    GFR African American >60 >60 mL/min/1.73m2    Anion Gap 11 4 - 16   PTT   Result Value Ref Range    aPTT 29.3 21.2 - 33.0 SECONDS   PT - INR   Result Value Ref Range    Protime 12.0 9.12 - 12.5 SECONDS    INR 1.05 INDEX   Sedimentation Rate   Result Value Ref Range    Sed Rate 17 0 - 20 MM/HR   CRP   Result Value Ref Range    CRP, High Sensitivity 2.8 mg/L     IMAGING:  Xr Chest Portable    Result Date: 5/4/2019  EXAMINATION: SINGLE XRAY VIEW OF THE CHEST 5/4/2019 7:38 pm COMPARISON: 05/04/2019 at 1422 hours HISTORY: ORDERING SYSTEM PROVIDED HISTORY: CP TECHNOLOGIST PROVIDED HISTORY: Reason for exam:->CP Ordering Physician Provided Reason for Exam: chest pain Acuity: Acute Type of Exam: Initial Additional signs and symptoms: chest pain Relevant Medical/Surgical History: chest pain FINDINGS: Heart size and configuration are normal.  The lungs are clear. No pneumothorax or pleural effusion. No acute bone finding. No acute cardiopulmonary disease. Xr Chest Portable    Result Date: 5/4/2019  EXAMINATION: SINGLE XRAY VIEW OF THE CHEST 5/4/2019 2:20 pm COMPARISON: 10/16/2009 HISTORY: ORDERING SYSTEM PROVIDED HISTORY: chest pain TECHNOLOGIST PROVIDED HISTORY: Reason for exam:->chest pain FINDINGS: Normal heart size and pulmonary vasculature. No focal consolidations, pleural effusions, or pneumothorax.      No evidence of improvement. She states that the rash hurts, it is worsening in nature. There does appear to be crusting of the lips and a oral lesion identified on examination. Lab work was initiated, her lab work did show a mild leukocytosis but otherwise unremarkable. 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 They also feel the patient's sometime need evaluation from a burn center. Secondary this week and we will transfer the patient ED to ED for an evaluation from dermatology at [facility]. Dr. Luis Alberto Don the ER physician will be the accepting physician. Patient remained stable, we will see sent her to [facility] with a BLS unit. This patient was staffed with Dr. Victor Manuel Edwards. Vital signs and nursing notes reviewed during ED course. Patient care and presentation staffed with supervising MD.  All pertinent Lab data and radiographic results reviewed with patient at bedside. The patient and/or the family were informed of the results of any tests/labs/imaging, the treatment plan, and time was allotted to answer questions. Clinical  IMPRESSION    1. Mackay-Ephraim syndrome ([facility] [address]      Comment: Please note this report has been produced using speech recognition software and may contain errors related to that system including errors in grammar, punctuation, and spelling, as well as words and phrases that may be inappropriate. If there are any questions or concerns please feel free to contact the dictating provider for clarification.       Vannessa Randall 411, PA  06/01/19 7863

## 2019-06-01 NOTE — CARE COORDINATION
CM - room # 38 -pt at risk of readmission . Pt seen in ER today with complaint of \"Rash, Fever\". Previous vits and an admission from May 4 th ----thru----May 5 th ----were for CP without acute  coronary syndrome , hypokalemia and alcohol concerns .  Pt released tonight with diagnosis for rash syndrome -no further follow up for CM with Pt --Edelmira Blue / [facility] / [facility]

## 2019-06-01 NOTE — ED PROVIDER NOTES
I independently examined and evaluated Gustavo Karimi. In brief their history revealed burning rash to lips, skin, trunk. States spreading. No blistering or vesicular lesions noted. She had recently been on amoxicillin as well as Chantix. States she stopped to the Chantix 4 days ago. Has recently been on steroids and several antibiotics. Started with rash that they initially thought was poison ivy but has spread and gotten worse. States her eyes were swollen shut this morning and could not see until she placed ice on them for several hours. States there was draining and burning from her eyes. . Their exam revealed diffuse erythematous rash skin no signs of blistering or vesicles. Does have scabbing to her lips and some lesions on her hands. States she has new lesions every day. All diagnostic, treatment, and disposition decisions were made by myself in conjunction with the mid-level provider. Before disposition, questions were sought and answered with the patient. They have voiced understanding and agree with the plan: Patient has a white count of 15,000 with a left shift. Electrolytes are normal.. INR is normal. Sed rate 17. CRP is 2.8. Concern for early Mackay-Ephraim's syndrome. Patient will need to be admitted for further evaluation and treatment. Our hospitalist has deferred at [facility] and states that she will need to see dermatology and possibly have access to a  burn unit. Discussed with patient who agrees to plan. For all further details of the patient's emergency department visit, please see the mid-level practitioner's documentation.         Mariam Cleaning MD  06/01/19 1936       Mariam Cleaning MD  06/01/19 0867

## 2019-06-01 NOTE — ED NOTES
Called and spoke with Mickie Saez RN at [address] [place] [address] for report; pt updated that about transport in about 30minutes; transfer paperwork signed; no questions at this time per pt       Randi Araya, [place]  06/01/19 3273

## 2019-06-01 NOTE — ED TRIAGE NOTES
Patient to ED with complaints of body rash x 7 days. Patient alert and oriented x 4. No signs of distress. Reports yard work 3 weeks ago. Started Chantix recently but states had rash prior to starting Chantix. Patients denies itching. Blisters intermittently throughout rash. Patient seen at multiple hospitals for same thing.

## 2023-03-11 ENCOUNTER — HOSPITAL ENCOUNTER (EMERGENCY)
Age: 48
Discharge: HOME | End: 2023-03-11
Attending: STUDENT IN AN ORGANIZED HEALTH CARE EDUCATION/TRAINING PROGRAM
Payer: MEDICAID

## 2023-03-11 ENCOUNTER — APPOINTMENT (OUTPATIENT)
Dept: GENERAL RADIOLOGY | Age: 48
End: 2023-03-11
Payer: MEDICAID

## 2023-03-11 VITALS
TEMPERATURE: 98 F | HEART RATE: 77 BPM | RESPIRATION RATE: 18 BRPM | DIASTOLIC BLOOD PRESSURE: 78 MMHG | HEIGHT: 61 IN | BODY MASS INDEX: 47.39 KG/M2 | SYSTOLIC BLOOD PRESSURE: 124 MMHG | OXYGEN SATURATION: 98 % | WEIGHT: 251 LBS

## 2023-03-11 DIAGNOSIS — S99.922A INJURY OF LEFT FOOT, INITIAL ENCOUNTER: Primary | ICD-10-CM

## 2023-03-11 PROCEDURE — 73610 X-RAY EXAM OF ANKLE: CPT

## 2023-03-11 PROCEDURE — 73630 X-RAY EXAM OF FOOT: CPT

## 2023-03-11 PROCEDURE — 6370000000 HC RX 637 (ALT 250 FOR IP): Performed by: STUDENT IN AN ORGANIZED HEALTH CARE EDUCATION/TRAINING PROGRAM

## 2023-03-11 PROCEDURE — 99283 EMERGENCY DEPT VISIT LOW MDM: CPT

## 2023-03-11 RX ORDER — IBUPROFEN 600 MG/1
600 TABLET ORAL ONCE
Status: COMPLETED | OUTPATIENT
Start: 2023-03-11 | End: 2023-03-11

## 2023-03-11 RX ORDER — IBUPROFEN 200 MG
200 TABLET ORAL EVERY 6 HOURS PRN
COMMUNITY

## 2023-03-11 RX ADMIN — IBUPROFEN 600 MG: 600 TABLET, FILM COATED ORAL at 18:53

## 2023-03-11 ASSESSMENT — PAIN - FUNCTIONAL ASSESSMENT: PAIN_FUNCTIONAL_ASSESSMENT: 0-10

## 2023-03-11 ASSESSMENT — PAIN SCALES - GENERAL
PAINLEVEL_OUTOF10: 5
PAINLEVEL_OUTOF10: 5

## 2023-03-11 ASSESSMENT — PAIN DESCRIPTION - LOCATION: LOCATION: FOOT

## 2023-03-11 ASSESSMENT — PAIN DESCRIPTION - ORIENTATION: ORIENTATION: LEFT

## 2023-03-11 ASSESSMENT — PAIN DESCRIPTION - DESCRIPTORS: DESCRIPTORS: SORE

## 2023-03-11 NOTE — ED PROVIDER NOTES
Emergency Department Encounter    Patient: Michela Darling  MRN: [id number]  : 1975  Date of Evaluation: 3/11/2023  ED Provider:  Linda Padgett DO    Triage Chief Complaint:   Foot Pain (C/o left foot pain since Thursday evening after falling while trying to walk up the stairs, patient states the right knee gave out causing the fall. Denies hitting head. Patient has been taking Ibuprofen for pain. )    Native:  Michela Darling is a 56 y.o. female that presents to the ED with a history of left foot injury. Patient states she twisted her foot while walking yesterday sustaining the injury. Patient took some pain medications at home but symptoms did not improve. Patient comes to the ED today for evaluation. No history of head injury, loss of consciousness, for focal neurologic deficits. ROS - see HPI, below listed is current ROS at time of my eval:  Review of Systems    ROS is an in history; otherwise unremarkable    Past Medical History:   Diagnosis Date    Lupus ([facility] [address]      Past Surgical History:   Procedure Laterality Date    CHOLECYSTECTOMY      DENTAL SURGERY      TUBAL LIGATION       History reviewed. No pertinent family history.   Social History     Socioeconomic History    Marital status:      Spouse name: Not on file    Number of children: Not on file    Years of education: Not on file    Highest education level: Not on file   Occupational History    Not on file   Tobacco Use    Smoking status: Former     Packs/day: 1.50     Types: Cigarettes    Smokeless tobacco: Never   Vaping Use    Vaping Use: Every day   Substance and Sexual Activity    Alcohol use: No    Drug use: No    Sexual activity: Not Currently   Other Topics Concern    Not on file   Social History Narrative    Not on file     Social Determinants of Health     Financial Resource Strain: Not on file   Food Insecurity: Not on file   Transportation Needs: Not on file   Physical Activity: Not on file   Stress: Not on file   Social Connections: Not on file   Intimate Partner Violence: Not on file   Housing Stability: Not on file     No current facility-administered medications for this encounter. Current Outpatient Medications   Medication Sig Dispense Refill    ibuprofen (ADVIL;MOTRIN) 200 MG tablet Take 200 mg by mouth every 6 hours as needed for Pain       Allergies   Allergen Reactions    Morphine Itching    Tape [Adhesive Tape] Rash       Nursing Notes Reviewed    Physical Exam:  Triage VS:    ED Triage Vitals [03/11/23 7649]   Enc Vitals Group      /78      Heart Rate 77      Resp 18      Temp 98 °F (36.7 °C)      Temp Source Oral      SpO2 98 %      Weight 251 lb (113.9 kg)      Height 5' 1\" (1.549 m)      Head Circumference       Peak Flow       Pain Score       Pain Loc       Pain Edu? Excl. in [address]? Physical Exam    My pulse ox interpretation is - normal    General appearance:  No acute distress. Skin:  Warm. Dry. Eye:  Extraocular movements intact. Ears, nose, mouth and throat:  Oral mucosa moist   Neck:  Trachea midline. Extremity:  No obvious deformity, erythema, or warmth. No swelling. Normal ROM. Distal pulses intact. Heart:  Regular rate and rhythm, normal S1 & S2, no extra heart sounds. Perfusion:  intact  Respiratory:  Lungs clear to auscultation bilaterally. Respirations nonlabored. Abdominal:  Normal bowel sounds. Soft. Nontender. Non distended. No Organomegaly. No bond, Mcburney, Rovsing, fluctuance, shifting dullness  Back:  No CVA tenderness to palpation     Neurological:  Alert and oriented times 3. No focal neuro deficits. Psychiatric:  Appropriate          I have reviewed and interpreted all of the currently available lab results from this visit (if applicable):  No results found for this visit on 03/11/23.    Radiographs (if obtained):  Radiologist's Report Reviewed:  XR FOOT LEFT (MIN 3 VIEWS)   Final Result   No acute fracture or Result   No acute fracture or dislocation. Moderate plantar calcaneal spurring. XR ANKLE LEFT (MIN 3 VIEWS)   Final Result   No acute fracture or dislocation. EKG (if obtained): (All EKG's are interpreted by myself in the absence of a cardiologist)    CRITICAL CARE NOTE:    MDM:    History source:   History Limitations:   56 y.o. female that presents to the ED with a history of left foot injury. Patient states she twisted her foot while walking yesterday sustaining the injury. Patient took some pain medications at home but symptoms did not improve. Patient comes to the ED today for evaluation. No history of head injury, loss of consciousness, for focal neurologic deficits. Physical examination is significant for L foot tenderness with mild swelling. No redness, warmth, or obvious deformity. Distal pulses intact. ROM limited by pain      Differentials considered include:    -Left foot fracture, Lisfranc, foot contusion, ligament injury,     Labs considered include:  -Considered: None  -Done: None  -Significant results: Not applicable      Imaging include:  -Considered: X-ray left foot  -Done: Same  -Significant results: Unremarkable    Consults:  -None    Procedures  -None  Procedures      Medications/Fluid:    Medications   ibuprofen (ADVIL;MOTRIN) tablet 600 mg (600 mg Oral Given 3/11/23 7970)         Social Determinants affecting management or disposition:    None  Disposition:  Considered: Discharge  Final disposition: Discharge        Plan is to discharge the patient. Plan was discussed with the patient who expressed understanding and agreement with the plan. Patient is also aware of the need for follow up with primary care and orthopedics. Patient is advised of the importance of follow up, as well as the potential dangers of not following up with primary care/orthopedics, after discharge from the ED.      More importantly patient is given return instructions to the ED, results    Consults:  -    Procedures  -  Procedures      Medications/Fluid:    Medications   ibuprofen (ADVIL;MOTRIN) tablet 600 mg (600 mg Oral Given 3/11/23 9919)         Social Determinants affecting management or disposition:      Disposition:  Considered  Final disposition    Plan is to discharge the patient. Plan was discussed with the patient who expressed understanding and agreement with the plan. Patient is also aware of the need for follow up with primary care. Patient is advised of the importance of follow up, as well as the potential dangers of not following up with primary care, after discharge from the ED. More importantly patient is given return instructions to the ED, including, but not limited to, a recurrence of symptoms, or worsening of symptoms with possible complications including, but not limited to, worsening of symptoms, intractable pain, swelling, loss of function, discoloration, focal weakness or sensory deficits in the extremities, or other symptoms of concern,  which were discussed with the patient. Patient was also given material and literature relevant to his/her symptoms. Patient is also given opportunity to ask questions and all questions are answered in appropriate details without the use of medical jargon. Patient verbalized understanding and agreement with the plan. Clinical Impression:  1. Injury of left foot, initial encounter      Disposition referral (if applicable):  Meir Abernathy DO  [address]  [phone]    Schedule an appointment as soon as possible for a visit in 2 days  As needed    Ananth Weber MD  [address] [address] [address] [facility]  [phone]    Schedule an appointment as soon as possible for a visit in 1 day    Disposition medications (if applicable):  New Prescriptions    No medications on file     ED Provider Disposition Time  DISPOSITION Decision To Discharge 03/11/2023 07:00:11 PM      Comment: Please note this report has been produced using speech recognition software and may contain errors related to that system including errors in grammar, punctuation, and spelling, as well as words and phrases that may be inappropriate. Efforts were made to edit the dictations.

## 2023-03-12 NOTE — ED NOTES
Discharge instructions reviewed with patient. No additional questions asked. Voiced understanding. Encouraged patient to follow up as discussed by the ED physician. Discussed RICE with patient. (Rest, Ice, Compression, and Elevation) REST affected area. ICE to painful area for 20 minutes as often as every hour. COMPRESSION with an ace wrap for comfort and support. Advised to check circulation frequent to assure wrap is not to tight. ELEVATION of the affected area, when not in use, to help decrease swelling.      Christian Schmitz RN  03/11/23 8594

## 2023-03-12 NOTE — DISCHARGE INSTRUCTIONS
Follow-up with primary care as soon as possible  Follow-up with orthopedics per referral  Take OTC medication as needed for pain  Use rest, ice, and elevation as needed for comfort  Return to the ED if symptoms persist or worsen

## 2025-01-18 ENCOUNTER — HOSPITAL ENCOUNTER (EMERGENCY)
Age: 50
Discharge: HOME | End: 2025-01-18
Attending: STUDENT IN AN ORGANIZED HEALTH CARE EDUCATION/TRAINING PROGRAM
Payer: MEDICAID

## 2025-01-18 VITALS
OXYGEN SATURATION: 97 % | WEIGHT: 260 LBS | RESPIRATION RATE: 16 BRPM | HEIGHT: 61 IN | DIASTOLIC BLOOD PRESSURE: 93 MMHG | HEART RATE: 78 BPM | SYSTOLIC BLOOD PRESSURE: 132 MMHG | TEMPERATURE: 97.9 F | BODY MASS INDEX: 49.09 KG/M2

## 2025-01-18 DIAGNOSIS — J06.9 VIRAL URI WITH COUGH: Primary | ICD-10-CM

## 2025-01-18 PROCEDURE — 99282 EMERGENCY DEPT VISIT SF MDM: CPT

## 2025-01-18 RX ORDER — LAMOTRIGINE 100 MG/1
100 TABLET ORAL DAILY
COMMUNITY
Start: 2025-01-16

## 2025-01-18 RX ORDER — BUPROPION HYDROCHLORIDE 150 MG/1
150 TABLET ORAL
COMMUNITY
Start: 2025-01-07

## 2025-01-18 RX ORDER — LAMOTRIGINE 25 MG/1
50 TABLET ORAL DAILY
COMMUNITY
Start: 2024-11-08

## 2025-01-18 RX ORDER — FLUOXETINE 40 MG/1
40 CAPSULE ORAL DAILY
COMMUNITY
Start: 2025-01-08

## 2025-01-18 ASSESSMENT — PAIN - FUNCTIONAL ASSESSMENT
PAIN_FUNCTIONAL_ASSESSMENT: NONE - DENIES PAIN
PAIN_FUNCTIONAL_ASSESSMENT: NONE - DENIES PAIN

## 2025-01-18 NOTE — ED NOTES
The patient presents with complaints of cough and congestion for 4-5 days. Family are at the bedside and the call light is within reach.

## 2025-01-18 NOTE — DISCHARGE INSTR - COC
Continuity of Care Form    Patient Name: Samantha Sandra   :  1975  MRN:  [id number]    Admit date:  2025  Discharge date:  ***    Code Status Order: Prior   Advance Directives:   Advance Care Flowsheet Documentation             Admitting Physician:  No admitting provider for patient encounter.  PCP: No primary care provider on file.    Discharging Nurse: ***  Discharging Hospital Unit/Room#: [facility]-1/OTF1  Discharging Unit Phone Number: ***    Emergency Contact:   Extended Emergency Contact Information  Primary Emergency Contact: Nadira Merrill  Home Phone: [phone]  Mobile Phone: [phone]  Relation: Parent  Preferred language: English   needed? No    Past Surgical History:  Past Surgical History:   Procedure Laterality Date    CHOLECYSTECTOMY      DENTAL SURGERY      TUBAL LIGATION         Immunization History:     There is no immunization history on file for this patient.    Active Problems:  Patient Active Problem List   Diagnosis Code    Substernal chest pain R07.2       Isolation/Infection:   Isolation            No Isolation          Patient Infection Status       None to display            Nurse Assessment:  Last Vital Signs: BP (!) 132/93   Pulse 78   Temp 97.9 °F (36.6 °C) (Oral)   Resp 16   Ht 1.549 m (5' 1\")   Wt 117.9 kg (260 lb)   LMP 2019 (Within Weeks)   SpO2 97%   BMI 49.13 kg/m²     Last documented pain score (0-10 scale):    Last Weight:   Wt Readings from Last 1 Encounters:   25 117.9 kg (260 lb)     Mental Status:  {IP PT MENTAL STATUS:91950}    IV Access:  { AMY IV ACCESS:264514395}    Nursing Mobility/ADLs:  Walking   {CHP DME ADLs:632269860}  Transfer  {CHP DME ADLs:870778584}  Bathing  {CHP DME ADLs:310450209}  Dressing  {CHP DME ADLs:283721046}  Toileting  {CHP DME ADLs:285122628}  Feeding  {CHP DME ADLs:669763461}  Med Admin  {CHP DME ADLs:737658723}  Med Delivery   { AMY MED Delivery:678543026}    Wound Care Documentation and Therapy:    Labs or Other Treatments After Discharge: ***    Physician Certification: I certify the above information and transfer of Samantha Sandra  is necessary for the continuing treatment of the diagnosis listed and that she requires {Admit to Appropriate Level of Care:51894} for {GREATER/LESS:997273841} 30 days.     Update Admission H&P: {CHP DME Changes in HandP:039800529}    PHYSICIAN SIGNATURE:  {Esignature:312818989}

## 2025-01-18 NOTE — ED PROVIDER NOTES
Financial Resource Strain: Not on file   Food Insecurity: Not on file   Transportation Needs: Not on file   Physical Activity: Not on file   Stress: Not on file   Social Connections: Not on file   Intimate Partner Violence: Not on file   Housing Stability: Not on file     Medications:   No current facility-administered medications for this encounter.     Current Outpatient Medications   Medication Sig Dispense Refill    buPROPion (WELLBUTRIN XL) 150 MG extended release tablet 1 tablet      lamoTRIgine (LAMICTAL) 100 MG tablet Take 1 tablet by mouth daily      lamoTRIgine (LAMICTAL) 25 MG tablet Take 2 tablets by mouth daily      FLUoxetine (PROZAC) 40 MG capsule Take 1 capsule by mouth daily      FLUoxetine (PROZAC) 20 MG capsule Take 1 capsule by mouth daily      ibuprofen (ADVIL;MOTRIN) 200 MG tablet Take 1 tablet by mouth every 6 hours as needed for Pain       Allergies:   Allergies   Allergen Reactions    Morphine Itching    Tylenol [Acetaminophen] Other (See Comments)     History of Lupus    Tape [Adhesive Tape] Rash       PMH, PSH, family history, social history, and allergies reviewed.     Review of Systems     Other than pertinent positives discussed in HPI, all other systems reviewed and are negative.    Physical Exam     ED Triage Vitals [01/18/25 1200]   Encounter Vitals Group      BP (!) 132/93      Systolic BP Percentile       Diastolic BP Percentile       Pulse 78      Respirations 16      Temp 97.9 °F (36.6 °C)      Temp Source Oral      SpO2 97 %      Weight - Scale 117.9 kg (260 lb)      Height 1.549 m (5' 1\")      Head Circumference       Peak Flow       Pain Score       Pain Loc       Pain Education       Exclude from Growth Chart        General: Awake. No acute distress.     HEENT: Normocephalic atraumatic. EOMI. Normal conjunctiva. No stridor, trismus or other evidence of upper airway obstruction. Oral mucosa is moist.     Neck: Supple. No visible masses or swelling. Trachea midline.      Respiratory: No increased work of breathing. Lungs clear to auscultation bilaterally. No wheezing or crackles.    Cardiovascular: Heart regular rate and rhythm. No murmur appreciated.     Abdomen: Soft, nontender, nondistended.    Extremities: No lower extremity erythema, edema or calf tenderness.  Spontaneous movement in all 4 extremities.    MS: No range of motion deficit.  Normal strength.  No gross deformities.    Neuro: Alert and oriented x 3.  No focal motor or sensory deficits.    Skin: Warm, dry, intact.  No rashes or lesions.      Diagnostic Study Results     Labs:  No results found for this visit on 01/18/25.      Radiologic Studies:   If obtained, pertinent radiology studies and findings discussed and MDM.    Medical Decision Making     Patient was triaged by nursing staff and I reviewed vital signs.  Available medical records were reviewed including nursing notes, past medical history, past surgical history, family history and social history.  History obtained by patient and/or family without any significant limitations.    MDM:     On initial exam patient is resting comfortably in bed in no acute distress.  Patient is afebrile and remaining vitals are within normal limits.  There is no increased work of breathing.  Lungs clear bilaterally.  Abdomen is soft and nontender.  Skin exam unremarkable.  No lower extremity pitting edema, erythema, or calf tenderness.      Overall exam reassuring.  Patient nontoxic-appearing.  Shared decision making utilized and ultimately plan was to hold off on any workup at this point in time.  Patient given strict return precautions and agrees with plans for discharge home with supportive care and outpatient follow-up with PCP.      Diagnosis and Disposition     CLINICAL IMPRESSION:  1. Viral URI with cough        Disposition: Discharge    Patient condition at time of disposition: Good    Disposition medications (if applicable):  New Prescriptions    No medications on